# Patient Record
Sex: FEMALE | Race: WHITE | NOT HISPANIC OR LATINO | Employment: OTHER | URBAN - METROPOLITAN AREA
[De-identification: names, ages, dates, MRNs, and addresses within clinical notes are randomized per-mention and may not be internally consistent; named-entity substitution may affect disease eponyms.]

---

## 2022-03-20 ENCOUNTER — HOSPITAL ENCOUNTER (EMERGENCY)
Facility: MEDICAL CENTER | Age: 52
End: 2022-03-20
Attending: EMERGENCY MEDICINE
Payer: COMMERCIAL

## 2022-03-20 ENCOUNTER — APPOINTMENT (OUTPATIENT)
Dept: RADIOLOGY | Facility: MEDICAL CENTER | Age: 52
End: 2022-03-20
Attending: NEUROLOGICAL SURGERY
Payer: COMMERCIAL

## 2022-03-20 ENCOUNTER — HOSPITAL ENCOUNTER (OUTPATIENT)
Dept: RADIOLOGY | Facility: MEDICAL CENTER | Age: 52
End: 2022-03-20

## 2022-03-20 VITALS
SYSTOLIC BLOOD PRESSURE: 142 MMHG | DIASTOLIC BLOOD PRESSURE: 89 MMHG | BODY MASS INDEX: 25.69 KG/M2 | HEIGHT: 63 IN | WEIGHT: 145 LBS | OXYGEN SATURATION: 94 % | TEMPERATURE: 98 F | RESPIRATION RATE: 16 BRPM | HEART RATE: 83 BPM

## 2022-03-20 DIAGNOSIS — S12.101A CLOSED NONDISPLACED FRACTURE OF SECOND CERVICAL VERTEBRA, UNSPECIFIED FRACTURE MORPHOLOGY, INITIAL ENCOUNTER (HCC): ICD-10-CM

## 2022-03-20 DIAGNOSIS — I77.74 VERTEBRAL ARTERY DISSECTION (HCC): ICD-10-CM

## 2022-03-20 PROBLEM — S12.100D: Status: ACTIVE | Noted: 2022-03-20

## 2022-03-20 PROBLEM — T14.90XA TRAUMA: Status: ACTIVE | Noted: 2022-03-20

## 2022-03-20 PROBLEM — Z53.09 CONTRAINDICATION TO DEEP VEIN THROMBOSIS (DVT) PROPHYLAXIS: Status: ACTIVE | Noted: 2022-03-20

## 2022-03-20 PROBLEM — Z11.52 ENCOUNTER FOR SCREENING FOR COVID-19: Status: ACTIVE | Noted: 2022-03-20

## 2022-03-20 PROBLEM — S12.101D CLOSED NONDISPLACED FRACTURE OF SECOND CERVICAL VERTEBRA WITH ROUTINE HEALING: Status: ACTIVE | Noted: 2022-03-20

## 2022-03-20 LAB
ABO GROUP BLD: NORMAL
ALBUMIN SERPL BCP-MCNC: 4.8 G/DL (ref 3.2–4.9)
ALBUMIN/GLOB SERPL: 1.9 G/DL
ALP SERPL-CCNC: 74 U/L (ref 30–99)
ALT SERPL-CCNC: 28 U/L (ref 2–50)
ANION GAP SERPL CALC-SCNC: 14 MMOL/L (ref 7–16)
APTT PPP: 26.3 SEC (ref 24.7–36)
AST SERPL-CCNC: 27 U/L (ref 12–45)
BILIRUB SERPL-MCNC: 0.3 MG/DL (ref 0.1–1.5)
BLD GP AB SCN SERPL QL: NORMAL
BUN SERPL-MCNC: 12 MG/DL (ref 8–22)
CALCIUM SERPL-MCNC: 9.5 MG/DL (ref 8.5–10.5)
CHLORIDE SERPL-SCNC: 101 MMOL/L (ref 96–112)
CO2 SERPL-SCNC: 21 MMOL/L (ref 20–33)
CREAT SERPL-MCNC: 0.59 MG/DL (ref 0.5–1.4)
ERYTHROCYTE [DISTWIDTH] IN BLOOD BY AUTOMATED COUNT: 47.3 FL (ref 35.9–50)
ETHANOL BLD-MCNC: <10.1 MG/DL (ref 0–10)
GFR SERPLBLD CREATININE-BSD FMLA CKD-EPI: 109 ML/MIN/1.73 M 2
GLOBULIN SER CALC-MCNC: 2.5 G/DL (ref 1.9–3.5)
GLUCOSE SERPL-MCNC: 126 MG/DL (ref 65–99)
HCG SERPL QL: NEGATIVE
HCT VFR BLD AUTO: 41.4 % (ref 37–47)
HGB BLD-MCNC: 13.5 G/DL (ref 12–16)
INR PPP: 0.96 (ref 0.87–1.13)
MCH RBC QN AUTO: 31.2 PG (ref 27–33)
MCHC RBC AUTO-ENTMCNC: 32.6 G/DL (ref 33.6–35)
MCV RBC AUTO: 95.6 FL (ref 81.4–97.8)
PLATELET # BLD AUTO: 359 K/UL (ref 164–446)
PMV BLD AUTO: 7.8 FL (ref 9–12.9)
POTASSIUM SERPL-SCNC: 3.7 MMOL/L (ref 3.6–5.5)
PROT SERPL-MCNC: 7.3 G/DL (ref 6–8.2)
PROTHROMBIN TIME: 12.5 SEC (ref 12–14.6)
RBC # BLD AUTO: 4.33 M/UL (ref 4.2–5.4)
RH BLD: NORMAL
SODIUM SERPL-SCNC: 136 MMOL/L (ref 135–145)
WBC # BLD AUTO: 13.7 K/UL (ref 4.8–10.8)

## 2022-03-20 PROCEDURE — 86850 RBC ANTIBODY SCREEN: CPT

## 2022-03-20 PROCEDURE — 700111 HCHG RX REV CODE 636 W/ 250 OVERRIDE (IP): Performed by: EMERGENCY MEDICINE

## 2022-03-20 PROCEDURE — 99285 EMERGENCY DEPT VISIT HI MDM: CPT | Performed by: SURGERY

## 2022-03-20 PROCEDURE — 80053 COMPREHEN METABOLIC PANEL: CPT

## 2022-03-20 PROCEDURE — 86900 BLOOD TYPING SEROLOGIC ABO: CPT

## 2022-03-20 PROCEDURE — 84703 CHORIONIC GONADOTROPIN ASSAY: CPT

## 2022-03-20 PROCEDURE — 85730 THROMBOPLASTIN TIME PARTIAL: CPT

## 2022-03-20 PROCEDURE — 85610 PROTHROMBIN TIME: CPT

## 2022-03-20 PROCEDURE — 82077 ASSAY SPEC XCP UR&BREATH IA: CPT

## 2022-03-20 PROCEDURE — 72141 MRI NECK SPINE W/O DYE: CPT

## 2022-03-20 PROCEDURE — 99285 EMERGENCY DEPT VISIT HI MDM: CPT

## 2022-03-20 PROCEDURE — 96374 THER/PROPH/DIAG INJ IV PUSH: CPT

## 2022-03-20 PROCEDURE — 305948 HCHG GREEN TRAUMA ACT PRE-NOTIFY NO CC

## 2022-03-20 PROCEDURE — 96375 TX/PRO/DX INJ NEW DRUG ADDON: CPT

## 2022-03-20 PROCEDURE — 85027 COMPLETE CBC AUTOMATED: CPT

## 2022-03-20 PROCEDURE — 86901 BLOOD TYPING SEROLOGIC RH(D): CPT

## 2022-03-20 RX ORDER — ASPIRIN 81 MG/1
81 TABLET, CHEWABLE ORAL DAILY
Qty: 14 TABLET | Refills: 0 | Status: SHIPPED | OUTPATIENT
Start: 2022-03-20 | End: 2022-04-03

## 2022-03-20 RX ORDER — MONTELUKAST SODIUM 10 MG/1
10 TABLET ORAL
COMMUNITY

## 2022-03-20 RX ORDER — ONDANSETRON 2 MG/ML
4 INJECTION INTRAMUSCULAR; INTRAVENOUS ONCE
Status: COMPLETED | OUTPATIENT
Start: 2022-03-20 | End: 2022-03-20

## 2022-03-20 RX ORDER — ONDANSETRON 4 MG/1
4 TABLET, ORALLY DISINTEGRATING ORAL EVERY 6 HOURS PRN
Qty: 10 TABLET | Refills: 0 | Status: SHIPPED | OUTPATIENT
Start: 2022-03-20 | End: 2022-03-25

## 2022-03-20 RX ORDER — HYDROCODONE BITARTRATE AND ACETAMINOPHEN 5; 325 MG/1; MG/1
1 TABLET ORAL EVERY 6 HOURS PRN
Qty: 10 TABLET | Refills: 0 | Status: SHIPPED | OUTPATIENT
Start: 2022-03-20 | End: 2022-03-25

## 2022-03-20 RX ORDER — MORPHINE SULFATE 4 MG/ML
4 INJECTION INTRAVENOUS ONCE
Status: COMPLETED | OUTPATIENT
Start: 2022-03-20 | End: 2022-03-20

## 2022-03-20 RX ADMIN — MORPHINE SULFATE 4 MG: 4 INJECTION INTRAVENOUS at 16:24

## 2022-03-20 RX ADMIN — ONDANSETRON 4 MG: 2 INJECTION INTRAMUSCULAR; INTRAVENOUS at 16:25

## 2022-03-20 ASSESSMENT — LIFESTYLE VARIABLES: DO YOU DRINK ALCOHOL: NO
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
## 2022-03-20 NOTE — ASSESSMENT & PLAN NOTE
Fully vaccinated (greater than 2 weeks following the second dose in a 2-dose series or greater than 2 weeks following a single-dose vaccine).  No fever, cough, shortness of breath, sore throat, or systemic symptoms. No CLOSE/DIRECT contact with confirmed COVID-19. SARS-CoV-2 testing not indicated.

## 2022-03-20 NOTE — ASSESSMENT & PLAN NOTE
Prophylactic anticoagulation for thrombotic prevention initially contraindicated secondary to elevated bleeding risk.
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
## 2022-03-20 NOTE — ED NOTES
All jewelry removed and placed into a specimen bag and placed in the pt's personal belonging bag.   Pt to MRI with imaging tech.

## 2022-03-20 NOTE — ED NOTES
[place] collar placed at bedside. C-spine maintained. Pt resting in gurney, VSS, NAD. Pt requesting pain medication, ERP aware. Pt otherwise has no further requests.

## 2022-03-21 NOTE — ED NOTES
Med rec updated and complete . Allergies reviewed.  Confirmed name and date of birth.   Home pharmacy  [facility] [phone]

## 2022-03-21 NOTE — ASSESSMENT & PLAN NOTE
C2 hangman's fracture no displacement.  MRI pending.  Non-operative management.   Cervical immobilization. Collar at all times except for during showering.  Luis Wharton MD. Neurosurgeon. [facility].  Follow up with outpatient xrays in 2 weeks.

## 2022-03-21 NOTE — ED NOTES
Pt back from MRI and was provided mouth swabs per her request. Pt resting in [facility], [facility] and has no further requests at this time

## 2022-04-20 ENCOUNTER — HOSPITAL ENCOUNTER (OUTPATIENT)
Dept: RADIOLOGY | Facility: MEDICAL CENTER | Age: 52
End: 2022-04-20
Attending: NURSE PRACTITIONER
Payer: COMMERCIAL

## 2022-04-20 DIAGNOSIS — I77.74 DISSECTION OF VERTEBRAL ARTERY (HCC): ICD-10-CM

## 2022-04-20 PROCEDURE — 700117 HCHG RX CONTRAST REV CODE 255: Performed by: NURSE PRACTITIONER

## 2022-04-20 PROCEDURE — 70496 CT ANGIOGRAPHY HEAD: CPT

## 2022-04-20 PROCEDURE — 70498 CT ANGIOGRAPHY NECK: CPT

## 2022-04-20 RX ADMIN — IOHEXOL 80 ML: 350 INJECTION, SOLUTION INTRAVENOUS at 17:17

## 2023-05-02 ENCOUNTER — RESEARCH ENCOUNTER (OUTPATIENT)
Dept: RESEARCH | Facility: WORKSITE | Age: 53
End: 2023-05-02
Payer: COMMERCIAL

## 2023-05-02 DIAGNOSIS — Z00.6 RESEARCH STUDY PATIENT: ICD-10-CM

## 2023-05-29 LAB
APOB+LDLR+PCSK9 GENE MUT ANL BLD/T: NOT DETECTED
BRCA1+BRCA2 DEL+DUP + FULL MUT ANL BLD/T: NOT DETECTED
MLH1+MSH2+MSH6+PMS2 GN DEL+DUP+FUL M: NOT DETECTED

## 2023-05-31 NOTE — RESULT ENCOUNTER NOTE
We have reviewed your [employer] [place] Project results. They are NEGATIVE for variants associated with hereditary breast and ovarian cancer, pereyra syndrome, and familial hypercholesterolemia. This means you are at average risk for these conditions. Please follow-up with your primary care provider or the Healthy [place] Project team at [phone] if you have any questions.

## 2025-02-18 ENCOUNTER — HOSPITAL ENCOUNTER (OUTPATIENT)
Dept: RADIOLOGY | Facility: MEDICAL CENTER | Age: 55
End: 2025-02-18
Attending: INTERNAL MEDICINE
Payer: COMMERCIAL

## 2025-02-18 ENCOUNTER — TELEPHONE (OUTPATIENT)
Dept: SURGERY | Facility: MEDICAL CENTER | Age: 55
End: 2025-02-18
Payer: COMMERCIAL

## 2025-02-18 NOTE — TELEPHONE ENCOUNTER
I left a voicemail for the patient to call and schedule an appointment with one of our breast surgeons for LCIS.

## 2025-02-19 NOTE — TELEPHONE ENCOUNTER
Spoke with Ynes and she told me her concerns of having a [facility] [employer] O plan that requires a referral for her to be seen at our office. 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 Asked her who her PCP is and she informed me it is Aida WHITNEY at [facility]. She also informed me she did not have an appt with her till March to get the referral. I told the pt that I will call [facility] and try to help expedite the referral. Told Ynes I will be in touch. All questions answered.    Spoke with  for Aida WHITNEY and informed them that we need a referral to be submitted STAT due to the delay in care already. Told the office her diagnosis and the urgency of the referral. Informed them the referral needs to be submitted through the [facility].  She understood and said she will relay the message to the providers MA. Gave my direct line and FAX #.     Kj

## 2025-02-27 NOTE — Clinical Note
REFERRAL APPROVAL NOTICE         Sent on February 27, 2025                   Ynes Pelaez  [address] [place] [address]                   Dear Ms. Pelaez,    After a careful review of the medical information and benefit coverage, [facility] has processed your referral. See below for additional details.    If applicable, you must be actively enrolled with your insurance for coverage of the authorized service. If you have any questions regarding your coverage, please contact your insurance directly.    REFERRAL INFORMATION   Referral #:  [id number]  Referred-To Department    Referred-By Provider:  Surgery    AUBREY Kumari   Breast Surgery [facility]      [address] [address]  [phone] [address]  [facility] [id number] [phone]    Referral Start Date:  02/20/2025  Referral End Date:   02/20/2026             SCHEDULING  If you do not already have an appointment, please call [phone] to make an appointment.     MORE INFORMATION  If you do not already have a [facility] account, sign up at: [email or web address]  You can access your medical information, make appointments, see lab results, billing information, and more.  If you have questions regarding this referral, please contact  the [facility] Referrals department at:             [phone]. Monday - Friday 8:00AM - 5:00PM.     Sincerely,    [facility]

## 2025-03-04 PROBLEM — J30.9 ALLERGIC RHINITIS: Status: ACTIVE | Noted: 2021-09-17

## 2025-03-04 PROBLEM — E55.9 VITAMIN D DEFICIENCY: Status: ACTIVE | Noted: 2021-09-17

## 2025-03-04 PROBLEM — E78.5 DYSLIPIDEMIA: Status: ACTIVE | Noted: 2022-09-21

## 2025-03-04 PROBLEM — D05.02 LOBULAR CARCINOMA IN SITU (LCIS) OF LEFT BREAST: Status: ACTIVE | Noted: 2021-09-17

## 2025-03-04 PROBLEM — R92.30 DENSE BREAST TISSUE: Status: ACTIVE | Noted: 2021-10-26

## 2025-03-04 PROBLEM — J45.990 EXERCISE-INDUCED ASTHMA: Status: ACTIVE | Noted: 2021-09-17

## 2025-03-04 PROBLEM — M54.42 ACUTE LEFT-SIDED LOW BACK PAIN WITH LEFT-SIDED SCIATICA: Status: ACTIVE | Noted: 2023-10-04

## 2025-03-04 PROBLEM — B00.9 HERPESVIRUS INFECTION: Status: ACTIVE | Noted: 2021-09-17

## 2025-03-04 PROBLEM — N63.21 MASS OF UPPER OUTER QUADRANT OF LEFT BREAST: Status: ACTIVE | Noted: 2024-08-05

## 2025-03-04 RX ORDER — VALACYCLOVIR HYDROCHLORIDE 500 MG/1
1000 TABLET, FILM COATED ORAL 2 TIMES DAILY
COMMUNITY
Start: 2024-12-16

## 2025-03-05 ENCOUNTER — OFFICE VISIT (OUTPATIENT)
Dept: SURGERY | Facility: MEDICAL CENTER | Age: 55
End: 2025-03-05
Payer: COMMERCIAL

## 2025-03-05 VITALS
OXYGEN SATURATION: 95 % | HEIGHT: 62 IN | HEART RATE: 67 BPM | SYSTOLIC BLOOD PRESSURE: 147 MMHG | WEIGHT: 162 LBS | DIASTOLIC BLOOD PRESSURE: 80 MMHG | BODY MASS INDEX: 29.81 KG/M2 | TEMPERATURE: 97.4 F

## 2025-03-05 DIAGNOSIS — D05.02 LOBULAR CARCINOMA IN SITU (LCIS) OF LEFT BREAST: ICD-10-CM

## 2025-03-05 PROCEDURE — 99205 OFFICE O/P NEW HI 60 MIN: CPT | Performed by: SURGERY

## 2025-03-05 PROCEDURE — 3077F SYST BP >= 140 MM HG: CPT | Performed by: SURGERY

## 2025-03-05 PROCEDURE — 3079F DIAST BP 80-89 MM HG: CPT | Performed by: SURGERY

## 2025-03-05 ASSESSMENT — ENCOUNTER SYMPTOMS
DIZZINESS: 1
EYE PROBLEMS: 1
HOT FLASHES: 1
ARTHRALGIAS: 1

## 2025-05-29 ENCOUNTER — PATIENT OUTREACH (OUTPATIENT)
Dept: ONCOLOGY | Facility: MEDICAL CENTER | Age: 55
End: 2025-05-29
Payer: COMMERCIAL

## 2025-05-29 DIAGNOSIS — D05.02 LOBULAR CARCINOMA IN SITU (LCIS) OF LEFT BREAST: Primary | ICD-10-CM

## 2025-05-29 NOTE — PROGRESS NOTES
Oncology Nurse Navigation  Pt wishes to transfer to [facility] so she can consolidate her care to one facility.  She explains she is currently seeing Dr. Jordan in [place] and has started taking Tamoxifen.  ONN agreed to facilitate transfer.  Will request a referral be placed to [facility].

## 2025-06-11 NOTE — Clinical Note
REFERRAL APPROVAL NOTICE         Sent on Flavia 10, 2025                   Ynes Pelaez  [address] [facility] [address]                   Dear Ms. Pelaez,    After a careful review of the medical information and benefit coverage, [facility] has processed your referral. See below for additional details.    If applicable, you must be actively enrolled with your insurance for coverage of the authorized service. If you have any questions regarding your coverage, please contact your insurance directly.    REFERRAL INFORMATION   Referral #:  [id number]  Referred-To Department    Referred-By Provider:  Hematology Oncology    Cathie Randall M.D.   [facility]      [address]  [address]  [facility] [address]  [phone] [address]  [address]  [facility] [id number] [phone]    Referral Start Date:  05/29/2025  Referral End Date:   05/30/2026           SCHEDULING  If you do not already have an appointment, please call [phone] to make an appointment.   MORE INFORMATION  As a reminder, Medical [facility] ownership has changed, meaning this location is now owned and operated by [facility]. As such, we want to clarify that our patients should expect to receive two separate bills for the services received at [facility] - one representing the [facility] facility fees as the owner of the establishment, and the other to represent the physician's services and subsequent fees. You can speak with your insurance carrier for a pricing estimate by calling the customer service number on the back of your card and ask about charges for a hospital outpatient visit.  If you do not already have a [employer] is now [employer] account, sign up at: [email or web address]  You can access your medical information, make appointments, see lab results, billing information, and more.  If you have questions regarding this referral, please contact  the [facility] Referrals department at:              [phone]. Monday - Friday 7:30AM - 5:00PM.      Sincerely,  [facility]

## 2025-07-09 ENCOUNTER — HOSPITAL ENCOUNTER (OUTPATIENT)
Facility: MEDICAL CENTER | Age: 55
End: 2025-07-09
Attending: NURSE PRACTITIONER
Payer: COMMERCIAL

## 2025-07-09 VITALS
RESPIRATION RATE: 17 BRPM | HEART RATE: 79 BPM | TEMPERATURE: 97.4 F | SYSTOLIC BLOOD PRESSURE: 118 MMHG | OXYGEN SATURATION: 97 % | WEIGHT: 159.94 LBS | HEIGHT: 62 IN | DIASTOLIC BLOOD PRESSURE: 82 MMHG | BODY MASS INDEX: 29.43 KG/M2

## 2025-07-09 DIAGNOSIS — D05.02 LOBULAR CARCINOMA IN SITU (LCIS) OF LEFT BREAST: Primary | ICD-10-CM

## 2025-07-09 DIAGNOSIS — Z91.89 AT HIGH RISK FOR BREAST CANCER: ICD-10-CM

## 2025-07-09 PROCEDURE — 99212 OFFICE O/P EST SF 10 MIN: CPT | Performed by: NURSE PRACTITIONER

## 2025-07-09 PROCEDURE — 99204 OFFICE O/P NEW MOD 45 MIN: CPT | Performed by: NURSE PRACTITIONER

## 2025-07-09 RX ORDER — TAMOXIFEN CITRATE 10 MG/1
10 TABLET ORAL DAILY
COMMUNITY
Start: 2025-06-01

## 2025-07-09 ASSESSMENT — FIBROSIS 4 INDEX: FIB4 SCORE: 0.52

## 2025-07-09 ASSESSMENT — PAIN SCALES - GENERAL: PAINLEVEL_OUTOF10: NO PAIN

## 2025-07-09 NOTE — PROGRESS NOTES
Subjective     Ynes Pelaez is a 55 y.o. female who presents with New Patient ([place]/ Lobular carcinoma in situ (LCIS) of left breast/ Cathie Randall//)          HPI    Referring Physician: Cathie Contreras MD  Primary Care:  AUBREY Kumari     Diagnosis: LCIS     Chief Complaint: Patient seen today for consultation for high risk breast for history of LCIS.    Breast history of presenting illness:  Patient with a significant history of LCIS of the left breast.  She was initially diagnosed in December 2020 in [place] with a left stereotactic biopsy, and did undergo a left excisional biopsy on 3/25/2021 also noting atypical lobular hyperplasia.  She moved back to [place] and did establish with Dr. Jordan, medical oncology at [facility].  She had discussion and recommendations to move forward with risk reduction medication, tamoxifen however patient was not interested at that time.  She did undergo high risk surveillance and in September 2024 she had an abnormal mammogram.  She underwent a biopsy on 9/20/2024 and again noted to have LCIS.    She did have further discussion after her second diagnosis of LCIS with Dr. Jordan.  She was very anxious about starting risk reduction medication due to risks and symptoms/side effects.  She established with Dr. Contreras, breast surgeon in March 2025 who had further discussion with regards to surveillance and medications.  She was seen by Dr. Jordan's PA in [facility] and did agree to start risk reduction medications with tamoxifen on 6/1/2025.  Patient continues to be concerned about symptoms and side effects of tamoxifen and therefore was started on 10 mg p.o. every other day.  She has been tolerating this fairly well.  She does state that she feels exhausted on the days that she takes the medication.  She has been experiencing night sweats only, almost every night.  Denies any generalized or new musculoskeletal symptoms.    Did confirm with  "patient that she has had a uterine ablation in  but is still experiencing symptoms of a menstrual cycle.  She did state that her PCP did check her hormone levels just prior to starting tamoxifen and was found to be perimenopausal.    Patient with a significant family history of cancer including:  - Sister breast cancer age 44 -tested negative for BRCA1/2  - Father prostate cancer age 52  - Paternal grandfather prostate cancer, age 84 and breast cancer age 86  - Paternal grandmother ovarian cancer age 67    Patient has been previously tested with [facility] on 3/2021 and was negative for any deleterious mutations.    Obstetrics:     Age of first birth: 29  Breast feed: Yes  Menarche age: 14  Birth control taken: About 20 years OC; then IUD for a few years  Menopause age: N/A  HRT: N/A    Luly Keller Model:  Luly-Krishna v7: 52.89% - Lifetime risk (10.98% - 5 year risk)  Luly-Krishna v8: 44.43% - Lifetime risk (8.71% - 5-year risk)    Treatment history:  25: Initiation of Tamoxifen 10 mg PO every other day      Allergies[1]    Medications Ordered Prior to Encounter[2]    Past Medical History[3]    Past Surgical History[4]    Family History   Problem Relation Age of Onset    Prostate cancer Father 53    Breast Cancer Sister 41    Ovarian Cancer Paternal Grandmother 60 - 69       Social History[5]      Review of Systems   Constitutional:  Positive for diaphoresis and malaise/fatigue. Negative for chills, fever and weight loss.   Respiratory:  Negative for cough and shortness of breath.    Cardiovascular:  Negative for chest pain and palpitations.   Gastrointestinal:  Negative for constipation, diarrhea, nausea and vomiting.   Genitourinary:  Negative for dysuria.   Musculoskeletal:  Negative for myalgias.   Neurological:  Negative for dizziness and headaches.              Objective     /82   Pulse 79   Temp 36.3 °C (97.4 °F) (Temporal)   Resp 17   Ht 1.575 m (5' 2.01\")   Wt 72.6 kg (159 lb 15.1 " oz)   SpO2 97%   BMI 29.25 kg/m²      Physical Exam  Vitals reviewed.   Constitutional:       General: She is not in acute distress.     Appearance: Normal appearance. She is not diaphoretic.   HENT:      Head: Normocephalic and atraumatic.   Cardiovascular:      Rate and Rhythm: Normal rate and regular rhythm.      Heart sounds: Normal heart sounds. No murmur heard.     No friction rub. No gallop.   Pulmonary:      Effort: Pulmonary effort is normal. No respiratory distress.      Breath sounds: Normal breath sounds. No wheezing.   Musculoskeletal:         General: No swelling or tenderness. Normal range of motion.   Skin:     General: Skin is warm and dry.   Neurological:      Mental Status: She is alert and oriented to person, place, and time.   Psychiatric:         Mood and Affect: Mood normal.         Behavior: Behavior normal.            MA-DIAGNOSTIC MAMMO LEFT W/TOMOSYNTHESIS W/O CAD US-BREAST UNI OR BILAT   03/10/25  Impression    Benign.    BI-RADS: 2 - Benign    RECOMMENDATION: Bilateral. Return to annual screening mammogram.. Results and  recommendations were given to the patient verbally at the termination of the  procedure.    LIFETIME VALE SCORE: 18.67 %      MRI Breast WWO IV Cont including CAD Bilateral   12/09/24   Impression      No suspicious MR enhancement to suggest malignancy. No lymphadenopathy.  Fibrocystic breast change.    BI-RADS: 2 - Benign    RECOMMENDATION: Bilateral. Routine screening mammogram in 1 year. Breast MRI  screening is deemed clinically appropriate.    LIFETIME VALE SCORE: 18.7 %          Assessment & Plan     1. Lobular carcinoma in situ (LCIS) of left breast        2. At high risk for breast cancer                Assessment & Plan  Patient with a pathology finding of LCIS in 2020 and 2024.  Based on these results I did discuss with patient the recommendations for continued management and monitoring/surveillance with this diagnosis of LCIS.  Per NCCN guidelines the  following recommendations are:     1.  Clinical encounters with follow-up and breast examination every 6-12 months to begin at the time of diagnosis.  Next due September 2025 per breast surgical team.     2.  Annual screening mammogram with tomosynthesis.  Next due September 2025 per breast surgical team.     3.  Consideration of an annual breast MRI with and without contrast.  Next due in March 2026 per breast surgical team.     4.  Risk reduction strategies are recommended per NCCN guidelines.  Risk reducing agents is strongly recommended such as tamoxifen, raloxifene, or an aromatase inhibitor. According to NCCN guidelines for Tamoxifen 20 mg/day for 5 years was shown to reduce risk of breast cancer by 49%.  For individuals with a history of atypical hyperplasia this dose and duration was associated with an 86% reduction in breast cancer risk.  However low-dose tamoxifen 5 mg daily for 3 years is also an option if patient is symptomatic on the original dose or not willing or unable to take the standard dose tamoxifen.  Discussed this in detail with the patient today.  She is anxious with regards to symptoms and side effects associated with tamoxifen and is currently been started on 10 mg p.o. every other day per another provider.  We discussed the recommendations being 20 mg daily, or we could consider 5 mg daily (low-dose) if she continues to have significant symptoms that are not tolerable.    5. With Tamoxifen use, patient will need pelvic examination either with her PCP or gynecologist.  She will need to undergo thorough pelvic examination annually due to increased risk for endometrial cancer with Tamoxifen. Discussed this in detail with the patient today.  Patient confirmed that she is scheduled to see her gynecologist in August 2025.     6.  I did inform the patient that a LCIS diagnosis, NCCN guidelines did not recommend surgical intervention. Patient has discussed with Dr. Contreras as well who agrees.   Discussed this with patient today.    7. Healthy lifestyle for breast cancer risk reduction recommendations: Alcohol consumption should be limited to no more than 1 drink per day, and no more than 3 drinks per week; 150-300 minutes of moderate-intensity per week; and weight control.        Plan:  It is recommended based on patient's history with the LCIS, diagnosed in 2020 as well as 2024 that she proceed with risk reduction medications.  She did choose to move forward with risk reduction medications and has already been prescribed initially starting on 6/1/2025 per previous oncologist in [place].  At this time we will continue with the tamoxifen as planned.  We discussed potentially moving with the low-dose regimen which would be 5 mg p.o. daily but will reevaluate at her follow-up visit.    Patient to continue to follow-up with Dr. Contreras and her team for high risk surveillance.  Will defer to them for continuing surveillance.  Patient is in agreement with this plan.    I would like patient to follow-up in the clinic in approximately 2 months.  Will try to arrange her follow-up visit with me in September 2025 around her appointment with the breast surgical team as she will be due for a clinical breast exam and her mammogram.  Discussed this with the patient she did verbalize understanding's and agreement the plan.      Please note that this dictation was created using voice recognition software. I have made every reasonable attempt to correct obvious errors, but I expect that there are errors of grammar and possibly content that I did not discover before finalizing the note.              [1] No Known Allergies  [2]   Current Outpatient Medications on File Prior to Encounter   Medication Sig Dispense Refill    tamoxifen (NOLVADEX) 10 MG Tab Take 10 mg by mouth every day. for 30 days      valACYclovir (VALTREX) 500 MG Tab Take 1,000 mg by mouth 2 times a day.      montelukast (SINGULAIR) 10 MG Tab Take 10 mg by  mouth at bedtime.       No current facility-administered medications on file prior to encounter.   [3]   Past Medical History:  Diagnosis Date    Hyperlipidemia    [4]   Past Surgical History:  Procedure Laterality Date    CHOLECYSTECTOMY      PRIMARY C SECTION      x2   [5]   Social History  Socioeconomic History    Marital status: Single   Tobacco Use    Smoking status: Never    Smokeless tobacco: Never   Vaping Use    Vaping status: Never Used   Substance and Sexual Activity    Alcohol use: Yes     Comment: 4 per week    Drug use: Not Currently   Social History Narrative    Owns a [email or web address] business     Social Drivers of Health     Financial Resource Strain: Low Risk  (4/17/2025)    Received from [facility]    SINCE Financial Resource Strain     In the last month, did you feel there was not enough money for items like clothing or furniture?: No   Food Insecurity: Low Risk  (4/17/2025)    Received from [facility]    SINCERE Food Insecurity     In the last month, did you feel there was not enough money for food?: No   Transportation Needs: Low Risk  (4/17/2025)    Received from [facility]    SINCE Transportation Needs     In the last month, have you not seen a doctor because you didn't have a way to get to the clinic or hospital?: No   Physical Activity: Unknown (10/26/2021)    Received from [facility]    Exercise Vital Sign     Days of Exercise per Week: Patient declined     Minutes of Exercise per Session: Patient declined   Stress: Unknown (10/26/2021)    Received from [facility]    [employer] - Occupational Stress Questionnaire     Feeling of Stress : Patient declined   Social Connections: Unknown (10/26/2021)    Received from [facility]    Social Connection and Isolation Panel [NHANES]     Frequency of Communication with Friends and Family: Patient declined     Frequency of Social  Gatherings with Friends and Family: Patient declined     Attends Voodoo Services: Patient declined     Active Member of Clubs or Organizations: Patient declined     Attends Club or Organization Meetings: Patient declined     Marital Status: Patient declined   Housing Stability: Low Risk  (4/17/2025)    Received from [facility]    SINCERE Housing Needs     In the last month, was there a time when you were not able to pay your mortgage or rent?: No     In the last month, have you slept outside, in a shelter, in a car, or any place not meant for sleeping?: No

## 2025-07-10 ASSESSMENT — ENCOUNTER SYMPTOMS
CONSTIPATION: 0
DIAPHORESIS: 1
MYALGIAS: 0
WEIGHT LOSS: 0
DIZZINESS: 0
SHORTNESS OF BREATH: 0
HEADACHES: 0
VOMITING: 0
DIARRHEA: 0
COUGH: 0
FEVER: 0
NAUSEA: 0
PALPITATIONS: 0
CHILLS: 0

## 2025-07-11 DIAGNOSIS — D05.02 LOBULAR CARCINOMA IN SITU (LCIS) OF LEFT BREAST: Primary | ICD-10-CM

## 2025-08-29 ENCOUNTER — HOSPITAL ENCOUNTER (OUTPATIENT)
Dept: RADIOLOGY | Facility: MEDICAL CENTER | Age: 55
End: 2025-08-29